# Patient Record
(demographics unavailable — no encounter records)

---

## 2024-10-31 NOTE — HISTORY OF PRESENT ILLNESS
[FreeTextEntry1] : Language: Georgian Accompanied by: Self Contact info: [phone] Referring Urologist: Janice PMD: Dr. Elton Vargas   Initial H&P 10/31/2024: Mr. TRIMBLE is a very pleasant 71-year-old gentleman who presents today for initial evaluation of post prostatectomy HEYDI and ED.   In 2018, had RALP at [facility] by Dr. Almanza.  Had HEYDI following RALP, which has been persistent since surgery, although improved since immediately after surgery.  Endorses (+) leak with cough and other abdominal straining maneuvers.  Small volume leaks.  Also endorses climacturia.  Uses 2-3ppd. Not soaked. Voids 2-3x/night.  Denies urgency or UUI.    Also endorses sensation of incomplete emptying and post-void dribbling.  PVR at Dr. GARZA's office was normal.   Was prescribed Gemtesa and Mirabegron in the past by Dr. Camacho.   Denies weak stream.    Also tried PFPT x2, which he did once at [facility] after his surgery and then again 1.5y ago.  Feels that it didn't help much, but only did it for 1 month. Did it 2x/week. Was not consistently doing it at home.   Endorses ED.  Rates firmness 1-2/4.  Rarely 2, and if it is 2, it goes away after about 10-15s.  Before surgery, had mild ED for which he was taking pills but erections were not as weak as they are now, and Viagra helped. Also was diagnosed with low T. Denies prior history of pain or curvature with erections. Denies issues with orgasm/ejaculation. Denies nocturnal erections.   Has also tried sildenafil after surgery, but did not have any effect.  Was only trying 20-40mg PRN.  Has never tried 100mg.  --------------------------------------------------------------------------------------------------------------------------------------- PMHx: Prostate Cancer, HTN, HLD, Depression, CAD, GERD, Hx of UDT  PSHx: CABG, RALP, Appy, CCY, Glaucoma, Pediatric IHR for UDT SHx: quit kristian 2016, rema kurtz   All: NKDA --------------------------------------------------------------------------------------------------------------------------------------- Physical Exam: General: NAD, sitting on exam table comfortably HEENT: NCAT, EOMI Resp: breathing comfortably on RA, b/l symm chest rise Cardiac: RRR Abd: SNTND, (+) umbilical hernia Back: (-) CVAT b/l MSK: TIFFANY razo/ FROM Psych: appropriate affect : normal appearing uncircumcised phallus, (+) CST, very small drops --------------------------------------------------------------------------------------------------------------------------------------- Results:   --------------------------------------------------------------------------------------------------------------------------------------- A/P: 71M with mild but bothersome post-prostatectomy incontinence and ED.   1. HEYDI We had a long discussion today about his post prostatectomy stress urinary incontinence and next steps in management.  I explained that it typically takes approximately 1 to 2 years for a patient to establish baseline continence following prostatectomy.  Following surgery, pelvic floor physical therapy may be performed to help hasten the recovery process, however there is significant evidence to suggest that at 1 year, there is no significant difference in continence rates between patients who have undergone pelvic floor physical therapy and those who have not.  He has specifically tried pelvic floor physical therapy twice, however  He only did it for 1 month at a time, and he notified me today that after doing extensive Kegels, he would start to experience right groin pain.  I explained that this is likely due to spasms in the pelvic floor, and I recommended that if he does want to proceed with physical therapy again, he should discuss this further with the physical therapist.  With regards to his treatment options, I explained that once physical therapy fails, the only options available are surgical in the form of sling versus AUS.  I explained that a sling has worse outcomes compared to AUS, and it is most successful in a highly selective group of patients.  He does have mild stress urinary incontinence and climacturia, both of which would be expected to improve following sling, however I also explained that it is oftentimes less successful as the BMI increases.  Although he is now 6 years postop from his prostatectomy, and I would not expect his incontinence to improve following pelvic floor physical therapy, I did explain that I have had personal success with a handful of patients who attempted physical therapy many years after the original prostatectomy.  As such, I offered him re-referral to PT, which I recommended that he do for 6 months.  He agreed with this plan, and he will see me in 3 months to assess any interval changes.  2. ED We discussed that he has multiple medical comorbidities that would contribute to his erectile dysfunction, in addition to his prior history of prostatectomy.  Given that he had erectile dysfunction prior to surgery, I explained that erectile function would be expected to worsen after prostatectomy.  He previously saw firmer erections with only 20 to 40 mg of sildenafil prior to surgery.  The maximum that he tried following surgery was 40 mg.  I am not optimistic that 100 mg would be effective, however I recommended that prior to discussing additional interventions, he should try the maximum dose.  We reviewed the various treatment modalities for erectile dysfunction and as it relates to him, we discussed the treatments he has tried as well as general goals of treatments for erectile dysfunction in general. In the end, we reviewed erectile dysfunction treatment consisting of phosphodiesterase inhibitors for those that are without contraindication, vacuum devices, intraurethral alprostadil also known as MUSE, penile injection therapy and the penile implant. He is interested in PDE5s.  AEs were discussed, including but not limited to headache, indigestion/dyspepsia, back pain/vision changes. All questions were answered.  I have answered all of his questions and will see him on follow-up in 3 months or sooner PRN.   Plan: - restart PFPT - may be interested in further discussion of sling at next visit - start sildenafil 100mg - RTC 3-6 months or sooner PRN  I spent a total of 49 minutes on face-to-face counseling, coordination of care, review of prior records and clinical documentation.

## 2025-05-09 NOTE — HISTORY OF PRESENT ILLNESS
[FreeTextEntry1] : Language: Faroese Accompanied by: Self Contact info: [phone] Referring Urologist: Janice PMD: Dr. Elton Vargas   Initial H&P 10/31/2024: Mr. TRIMBLE is a very pleasant 71-year-old gentleman who presents today for initial evaluation of post prostatectomy HEYDI and ED.   In 2018, had RALP at [facility] by Dr. Almanza.  Had HEYDI following RALP, which has been persistent since surgery, although improved since immediately after surgery.  Endorses (+) leak with cough and other abdominal straining maneuvers.  Small volume leaks.  Also endorses climacturia.  Uses 2-3ppd. Not soaked. Voids 2-3x/night.  Denies urgency or UUI.    Also endorses sensation of incomplete emptying and post-void dribbling.  PVR at Dr. GARZA's office was normal.   Was prescribed Gemtesa and Mirabegron in the past by Dr. Camacho.   Denies weak stream.    Also tried PFPT x2, which he did once at [facility] after his surgery and then again 1.5y ago.  Feels that it didn't help much, but only did it for 1 month. Did it 2x/week. Was not consistently doing it at home.   Endorses ED.  Rates firmness 1-2/4.  Rarely 2, and if it is 2, it goes away after about 10-15s.  Before surgery, had mild ED for which he was taking pills but erections were not as weak as they are now, and Viagra helped. Also was diagnosed with low T. Denies prior history of pain or curvature with erections. Denies issues with orgasm/ejaculation. Denies nocturnal erections.   Has also tried sildenafil after surgery, but did not have any effect.  Was only trying 20-40mg PRN.  Has never tried 100mg.  --------------------------------------------------------------------------------------------------------------------------------------- Interval History 05/08/2025: Presents today for f/u. Last seen in the office 10/31/24.   Recommended more aggressive PFPT last visit for his incontinence, and for his ED, he was started on sildenafil 100mg.   Today, he states that he did PT (in person) for 2 months and continued at home on his own since then.  Has not noticed a difference in incontinence. Continues to use 2-3ppd.    Recently saw Dr. Camacho, had RBUS, and at the time of the US, he had low volume in bladder and had very strong urge to go.    WRT erections - no improvement on 100mg sildenafil --------------------------------------------------------------------------------------------------------------------------------------- PMHx: Prostate Cancer, HTN, HLD, Depression, CAD, GERD, Hx of UDT  PSHx: CABG, RALP, Appy, CCY, Glaucoma, Pediatric IHR for UDT SHx: quit tog 2016, rema kurtz   All: NKDA --------------------------------------------------------------------------------------------------------------------------------------- Physical Exam: General: NAD, sitting on exam table comfortably HEENT: NCAT, EOMI Resp: breathing comfortably on RA, b/l symm chest rise Cardiac: RRR Abd: SNTND, (+) umbilical hernia Back: (-) CVAT b/l MSK: TIFFANY razo/ FROM Psych: appropriate affect : normal appearing uncircumcised phallus, (+) CST, very small drops --------------------------------------------------------------------------------------------------------------------------------------- Results:   --------------------------------------------------------------------------------------------------------------------------------------- A/P: 71M with mild but bothersome post-prostatectomy incontinence and ED.   1. HEYDI and possible UUI We had another long discussion today about his incontinence and next steps in management.  We briefly discussed surgery today (sling and AUS), and ultimately, based on his degree of incontinence, I feel that he would be a reasonable sling candidate.  He did mention today that he had strong urge to void with a low volume in his bladder, therefore I recommended he restart gemtesa and monitor for improvement of his incontinence. Given the possible mixed picture, we also discussed possible UDS.  He thinks he may have had this done with Dr. Camacho. We will obtain old records, and if adequate, will hold off on repeat.  If additional study needed --> referral to Dr. Ernandez for UDS.  If significant urgency/UUI present, may benefit from botox. I also recommended repeat cysto to r/o any potential anatomic etiologies of his symptoms, and as part of pre-op workup if he is interested in surgical intervention.   In the interim, he will also do his own research on sling.   2. ED Sildenafil was not effective in treating his ED.  He is potentially interested in additional treatment options, however we will discuss in further detail once incontinence is addressed.   3. Hypogonadism Was previously told he has low T.  Although he has a prior history of prostate ca, if PSAs have all been undetectable, and he did not have high risk disease, we discussed that TRT is not contraindicated based on the most recent evidence.  We also discussed that PDE5Is would potentially be more effective once eugonadal.   He will consider low T w/u upon f/u.   Plan: - review UDS from Janice - restart gemtesa - cysto next visit - poss low T w/u - cont sildenafil 100mg - RTC 6 weeks or sooner PRN  I spent a total of 38 minutes on face-to-face counseling, coordination of care, review of prior records and clinical documentation.

## 2025-05-09 NOTE — HISTORY OF PRESENT ILLNESS
[FreeTextEntry1] : Language: Emirati Accompanied by: Self Contact info: [phone] Referring Urologist: Janice PMD: Dr. Elton Vargas   Initial H&P 10/31/2024: Mr. TRIMBLE is a very pleasant 71-year-old gentleman who presents today for initial evaluation of post prostatectomy HEYDI and ED.   In 2018, had RALP at [facility] by Dr. Almanza.  Had HEYDI following RALP, which has been persistent since surgery, although improved since immediately after surgery.  Endorses (+) leak with cough and other abdominal straining maneuvers.  Small volume leaks.  Also endorses climacturia.  Uses 2-3ppd. Not soaked. Voids 2-3x/night.  Denies urgency or UUI.    Also endorses sensation of incomplete emptying and post-void dribbling.  PVR at Dr. GARZA's office was normal.   Was prescribed Gemtesa and Mirabegron in the past by Dr. Camacho.   Denies weak stream.    Also tried PFPT x2, which he did once at [facility] after his surgery and then again 1.5y ago.  Feels that it didn't help much, but only did it for 1 month. Did it 2x/week. Was not consistently doing it at home.   Endorses ED.  Rates firmness 1-2/4.  Rarely 2, and if it is 2, it goes away after about 10-15s.  Before surgery, had mild ED for which he was taking pills but erections were not as weak as they are now, and Viagra helped. Also was diagnosed with low T. Denies prior history of pain or curvature with erections. Denies issues with orgasm/ejaculation. Denies nocturnal erections.   Has also tried sildenafil after surgery, but did not have any effect.  Was only trying 20-40mg PRN.  Has never tried 100mg.  --------------------------------------------------------------------------------------------------------------------------------------- Interval History 05/08/2025: Presents today for f/u. Last seen in the office 10/31/24.   Recommended more aggressive PFPT last visit for his incontinence, and for his ED, he was started on sildenafil 100mg.   Today, he states that he did PT (in person) for 2 months and continued at home on his own since then.  Has not noticed a difference in incontinence. Continues to use 2-3ppd.    Recently saw Dr. Camacho, had RBUS, and at the time of the US, he had low volume in bladder and had very strong urge to go.    WRT erections - no improvement on 100mg sildenafil --------------------------------------------------------------------------------------------------------------------------------------- PMHx: Prostate Cancer, HTN, HLD, Depression, CAD, GERD, Hx of UDT  PSHx: CABG, RALP, Appy, CCY, Glaucoma, Pediatric IHR for UDT SHx: quit tog 2016, rema kurtz   All: NKDA --------------------------------------------------------------------------------------------------------------------------------------- Physical Exam: General: NAD, sitting on exam table comfortably HEENT: NCAT, EOMI Resp: breathing comfortably on RA, b/l symm chest rise Cardiac: RRR Abd: SNTND, (+) umbilical hernia Back: (-) CVAT b/l MSK: TIFFANY razo/ FROM Psych: appropriate affect : normal appearing uncircumcised phallus, (+) CST, very small drops --------------------------------------------------------------------------------------------------------------------------------------- Results:   --------------------------------------------------------------------------------------------------------------------------------------- A/P: 71M with mild but bothersome post-prostatectomy incontinence and ED.   1. HEYDI and possible UUI We had another long discussion today about his incontinence and next steps in management.  We briefly discussed surgery today (sling and AUS), and ultimately, based on his degree of incontinence, I feel that he would be a reasonable sling candidate.  He did mention today that he had strong urge to void with a low volume in his bladder, therefore I recommended he restart gemtesa and monitor for improvement of his incontinence. Given the possible mixed picture, we also discussed possible UDS.  He thinks he may have had this done with Dr. Camacho. We will obtain old records, and if adequate, will hold off on repeat.  If additional study needed --> referral to Dr. Ernandez for UDS.  If significant urgency/UUI present, may benefit from botox. I also recommended repeat cysto to r/o any potential anatomic etiologies of his symptoms, and as part of pre-op workup if he is interested in surgical intervention.   In the interim, he will also do his own research on sling.   2. ED Sildenafil was not effective in treating his ED.  He is potentially interested in additional treatment options, however we will discuss in further detail once incontinence is addressed.   3. Hypogonadism Was previously told he has low T.  Although he has a prior history of prostate ca, if PSAs have all been undetectable, and he did not have high risk disease, we discussed that TRT is not contraindicated based on the most recent evidence.  We also discussed that PDE5Is would potentially be more effective once eugonadal.   He will consider low T w/u upon f/u.   Plan: - review UDS from Janice - restart gemtesa - cysto next visit - poss low T w/u - cont sildenafil 100mg - RTC 6 weeks or sooner PRN  I spent a total of 38 minutes on face-to-face counseling, coordination of care, review of prior records and clinical documentation.

## 2025-07-02 NOTE — HISTORY OF PRESENT ILLNESS
[FreeTextEntry1] : Language: Djiboutian Accompanied by: Self Contact info: [phone] Referring Urologist: Janice PMD: Dr. Elton Vargas   Initial H&P 10/31/2024: Mr. TRIMBLE is a very pleasant 71-year-old gentleman who presents today for initial evaluation of post prostatectomy HEYDI and ED.   In 2018, had RALP at [facility] by Dr. Almanza.  Had HEYDI following RALP, which has been persistent since surgery, although improved since immediately after surgery.  Endorses (+) leak with cough and other abdominal straining maneuvers.  Small volume leaks.  Also endorses climacturia.  Uses 2-3ppd. Not soaked. Voids 2-3x/night.  Denies urgency or UUI.    Also endorses sensation of incomplete emptying and post-void dribbling.  PVR at Dr. GARZA's office was normal.   Was prescribed Gemtesa and Mirabegron in the past by Dr. Camacho.   Denies weak stream.    Also tried PFPT x2, which he did once at [facility] after his surgery and then again 1.5y ago.  Feels that it didn't help much, but only did it for 1 month. Did it 2x/week. Was not consistently doing it at home.   Endorses ED.  Rates firmness 1-2/4.  Rarely 2, and if it is 2, it goes away after about 10-15s.  Before surgery, had mild ED for which he was taking pills but erections were not as weak as they are now, and Viagra helped. Also was diagnosed with low T. Denies prior history of pain or curvature with erections. Denies issues with orgasm/ejaculation. Denies nocturnal erections.   Has also tried sildenafil after surgery, but did not have any effect.  Was only trying 20-40mg PRN.  Has never tried 100mg.  --------------------------------------------------------------------------------------------------------------------------------------- Interval History 07/01/25: Presents today for f/u. Last seen in the office 10/05/08/2025.   Recommended more aggressive PFPT previously for his incontinence, and for his ED, he was started on sildenafil 100mg.   He then went for 2 months of pelvic floor physical therapy in person, but he did not notice a difference in his degree of incontinence.  He continues to use 2-3 pads per day.  At his last visit, we discussed sling versus management of his urge urinary incontinence first.  I restarted his Gemtesa, and I reviewed his urodynamics study from Dr. Camacho, which showed severe urge at 185cc and max capacity of 300.    With regards to his erections, he did not see any improvement on 100 mg sildenafil.  T was also checked, which was 213 on 05/08/25.   Today, he states that he is seeing mixed results with gemtesa.  --------------------------------------------------------------------------------------------------------------------------------------- PMHx: Prostate Cancer, HTN, HLD, Depression, CAD, GERD, Hx of UDT  PSHx: CABG, RALP, Appy, CCY, Glaucoma, Pediatric IHR for UDT SHx: quit kristian 2016, rema kurtz   All: NKDA --------------------------------------------------------------------------------------------------------------------------------------- Physical Exam: General: NAD, sitting on exam table comfortably HEENT: NCAT, EOMI Resp: breathing comfortably on RA, b/l symm chest rise Cardiac: RRR Abd: SNTND, (+) umbilical hernia Back: (-) CVAT b/l MSK: TIFFANY razo/ FROM Psych: appropriate affect : normal appearing uncircumcised phallus, (+) CST, very small drops --------------------------------------------------------------------------------------------------------------------------------------- Results:   --------------------------------------------------------------------------------------------------------------------------------------- A/P: 71M with mild but bothersome post-prostatectomy incontinence and ED.   T recently checked and came back low.   1. HEYDI and possible UUI Previous UDS reviewed. Suggest mildly decreased capacity.  Recommended he cont gemtesa. Will consider botox based on degree of bother in the future.  Pt did his reading on sling - currently not bothered enough to proceed with surgery.   2. ED Sildenafil was not effective in treating his ED.  Asked about IPP today. Discussed in generalities. Understands that ICI would need to be done first and if ineffective/does not tolerate, would be candidate for IPP. Pt will do his own research on IPP in the meantime.   3. Hypogonadism Discussed that based on his cancer history (not high risk, all PSAs undetectable), we may start TRT.  Discussed that risk of cancer is no different from general population, however if he does already have prostate cancer present (low levels that is not producing PSA yet), then the testosterone will increase growth.  We will monitor closerly as with any TRT patient and if there is PSA detected, we will stop and reassess.   He was interested.   Discussed T Cyp vs Androgel.  Prescribed T Cyp. Pt will bring medication with him for injection teaching when able.   I have answered all of his questions today, and I will see him in ~2 weeks or sooner PRN (once he gets his T Cyp).  Plan: - cont gemtesa - start T Cyp - cont sildenafil 100mg - pt to consider ICI vs IPP - RTC ~2 weeks or sooner PRN  I spent a total of 30 minutes on face-to-face counseling, coordination of care, review of prior records and clinical documentation.

## 2025-07-02 NOTE — HISTORY OF PRESENT ILLNESS
[FreeTextEntry1] : Language: Cambodian Accompanied by: Self Contact info: [phone] Referring Urologist: Janice PMD: Dr. Elton Vargas   Initial H&P 10/31/2024: Mr. TRIMBLE is a very pleasant 71-year-old gentleman who presents today for initial evaluation of post prostatectomy HEYDI and ED.   In 2018, had RALP at [facility] by Dr. Almanza.  Had HEYDI following RALP, which has been persistent since surgery, although improved since immediately after surgery.  Endorses (+) leak with cough and other abdominal straining maneuvers.  Small volume leaks.  Also endorses climacturia.  Uses 2-3ppd. Not soaked. Voids 2-3x/night.  Denies urgency or UUI.    Also endorses sensation of incomplete emptying and post-void dribbling.  PVR at Dr. GARZA's office was normal.   Was prescribed Gemtesa and Mirabegron in the past by Dr. Camacho.   Denies weak stream.    Also tried PFPT x2, which he did once at [facility] after his surgery and then again 1.5y ago.  Feels that it didn't help much, but only did it for 1 month. Did it 2x/week. Was not consistently doing it at home.   Endorses ED.  Rates firmness 1-2/4.  Rarely 2, and if it is 2, it goes away after about 10-15s.  Before surgery, had mild ED for which he was taking pills but erections were not as weak as they are now, and Viagra helped. Also was diagnosed with low T. Denies prior history of pain or curvature with erections. Denies issues with orgasm/ejaculation. Denies nocturnal erections.   Has also tried sildenafil after surgery, but did not have any effect.  Was only trying 20-40mg PRN.  Has never tried 100mg.  --------------------------------------------------------------------------------------------------------------------------------------- Interval History 07/01/25: Presents today for f/u. Last seen in the office 10/05/08/2025.   Recommended more aggressive PFPT previously for his incontinence, and for his ED, he was started on sildenafil 100mg.   He then went for 2 months of pelvic floor physical therapy in person, but he did not notice a difference in his degree of incontinence.  He continues to use 2-3 pads per day.  At his last visit, we discussed sling versus management of his urge urinary incontinence first.  I restarted his Gemtesa, and I reviewed his urodynamics study from Dr. Camacho, which showed severe urge at 185cc and max capacity of 300.    With regards to his erections, he did not see any improvement on 100 mg sildenafil.  T was also checked, which was 213 on 05/08/25.   Today, he states that he is seeing mixed results with gemtesa.  --------------------------------------------------------------------------------------------------------------------------------------- PMHx: Prostate Cancer, HTN, HLD, Depression, CAD, GERD, Hx of UDT  PSHx: CABG, RALP, Appy, CCY, Glaucoma, Pediatric IHR for UDT SHx: quit kristian 2016, rema kurtz   All: NKDA --------------------------------------------------------------------------------------------------------------------------------------- Physical Exam: General: NAD, sitting on exam table comfortably HEENT: NCAT, EOMI Resp: breathing comfortably on RA, b/l symm chest rise Cardiac: RRR Abd: SNTND, (+) umbilical hernia Back: (-) CVAT b/l MSK: TIFFANY razo/ FROM Psych: appropriate affect : normal appearing uncircumcised phallus, (+) CST, very small drops --------------------------------------------------------------------------------------------------------------------------------------- Results:   --------------------------------------------------------------------------------------------------------------------------------------- A/P: 71M with mild but bothersome post-prostatectomy incontinence and ED.   T recently checked and came back low.   1. HEYDI and possible UUI Previous UDS reviewed. Suggest mildly decreased capacity.  Recommended he cont gemtesa. Will consider botox based on degree of bother in the future.  Pt did his reading on sling - currently not bothered enough to proceed with surgery.   2. ED Sildenafil was not effective in treating his ED.  Asked about IPP today. Discussed in generalities. Understands that ICI would need to be done first and if ineffective/does not tolerate, would be candidate for IPP. Pt will do his own research on IPP in the meantime.   3. Hypogonadism Discussed that based on his cancer history (not high risk, all PSAs undetectable), we may start TRT.  Discussed that risk of cancer is no different from general population, however if he does already have prostate cancer present (low levels that is not producing PSA yet), then the testosterone will increase growth.  We will monitor closerly as with any TRT patient and if there is PSA detected, we will stop and reassess.   He was interested.   Discussed T Cyp vs Androgel.  Prescribed T Cyp. Pt will bring medication with him for injection teaching when able.   I have answered all of his questions today, and I will see him in ~2 weeks or sooner PRN (once he gets his T Cyp).  Plan: - cont gemtesa - start T Cyp - cont sildenafil 100mg - pt to consider ICI vs IPP - RTC ~2 weeks or sooner PRN  I spent a total of 30 minutes on face-to-face counseling, coordination of care, review of prior records and clinical documentation.

## 2025-07-17 NOTE — HISTORY OF PRESENT ILLNESS
[FreeTextEntry1] : Language: Maltese Accompanied by: Self Contact info: [phone] Referring Urologist: Janice PMD: Dr. Elton Vargas   Initial H&P 10/31/2024: Mr. TRIMBLE is a very pleasant 71-year-old gentleman who presents today for initial evaluation of post prostatectomy HEYDI and ED.   In 2018, had RALP at [facility] by Dr. Almanza.  Had HEYDI following RALP, which has been persistent since surgery, although improved since immediately after surgery.  Endorses (+) leak with cough and other abdominal straining maneuvers.  Small volume leaks.  Also endorses climacturia.  Uses 2-3ppd. Not soaked. Voids 2-3x/night.  Denies urgency or UUI.    Also endorses sensation of incomplete emptying and post-void dribbling.  PVR at Dr. GARZA's office was normal.   Was prescribed Gemtesa and Mirabegron in the past by Dr. Camacho.   Denies weak stream.    Also tried PFPT x2, which he did once at [facility] after his surgery and then again 1.5y ago.  Feels that it didn't help much, but only did it for 1 month. Did it 2x/week. Was not consistently doing it at home.   Endorses ED.  Rates firmness 1-2/4.  Rarely 2, and if it is 2, it goes away after about 10-15s.  Before surgery, had mild ED for which he was taking pills but erections were not as weak as they are now, and Viagra helped. Also was diagnosed with low T. Denies prior history of pain or curvature with erections. Denies issues with orgasm/ejaculation. Denies nocturnal erections.   Has also tried sildenafil after surgery, but did not have any effect.  Was only trying 20-40mg PRN.  Has never tried 100mg.  --------------------------------------------------------------------------------------------------------------------------------------- Interval History 07/17/25: Presents today for f/u. Last seen in the office 07/01/25.   Recommended more aggressive PFPT previously for his incontinence, and for his ED, he was started on sildenafil 100mg.   He then went for 2 months of pelvic floor physical therapy in person, but he did not notice a difference in his degree of incontinence.  He continues to use 2-3 pads per day.  At his last visit, we discussed sling versus management of his urge urinary incontinence first.  I restarted his Gemtesa, and I reviewed his urodynamics study from Dr. Camacho, which showed severe urge at 185cc and max capacity of 300.    With regards to his erections, he did not see any improvement on 100 mg sildenafil.  T was also checked, which was 213 on 05/08/25.   He endorsed mixed results on the gemtesa, and after doing some of his own reading on incontinence surgery, he felt that he wasn't bothered enough to proceed. He was prescribed T Cyp at that visit, and presents today for injection teaching.   From an ED standpoint, he is considering ICI but ultimately may be interested in IPP.   No new issues in the interim.  --------------------------------------------------------------------------------------------------------------------------------------- PMHx: Prostate Cancer, HTN, HLD, Depression, CAD, GERD, Hx of UDT  PSHx: CABG, RALP, Appy, CCY, Glaucoma, Pediatric IHR for UDT SHx: quit tog 2016, denrashaad x2   All: NKDA --------------------------------------------------------------------------------------------------------------------------------------- Physical Exam: General: NAD, sitting on exam table comfortably HEENT: NCAT, EOMI Resp: breathing comfortably on RA, b/l symm chest rise Cardiac: RRR Abd: CRYSTAL, (+) umbilical hernia Back: (-) CVAT b/l MSK: TIFFANY razo/ FROM Psych: appropriate affect : normal appearing uncircumcised phallus, (+) CST, very small drops --------------------------------------------------------------------------------------------------------------------------------------- Results:   --------------------------------------------------------------------------------------------------------------------------------------- A/P: 71M with mild but bothersome post-prostatectomy incontinence and ED.   Started on T Cyp 100mg q2 weeks, with injection teaching performed today.    1. HEYDI and possible UUI Cont gemtesa. Not interested in surgery at this time. Will continue to monitor, and if LUTS worsen --> discuss surgery vs botox  2. ED No addressed today - will discuss ICI vs IPP once eugonadal  3. Hypogonadism Injection teaching performed today. Pt hesitant to do it on his own at home. Requested to come in again for 2 more injections, as he would like for me to do it until repeat BW.  Will plan to see again in for 2 more rounds, and if eugonadal after 3rd injection, and he is satisfied with results, will do it on his own at home.    I have answered all of his questions today, and I will see him in 2 weeks or sooner PRN.  Plan: - cont gemtesa - cont T Cyp - cont sildenafil 100mg - pt to consider ICI vs IPP - RTC ~2 weeks or sooner PRN  I spent a total of 20 minutes on face-to-face counseling, coordination of care, review of prior records and clinical documentation.

## 2025-07-17 NOTE — HISTORY OF PRESENT ILLNESS
[FreeTextEntry1] : Language: Ivorian Accompanied by: Self Contact info: [phone] Referring Urologist: Janice PMD: Dr. Elton Vargas   Initial H&P 10/31/2024: Mr. TRIMBLE is a very pleasant 71-year-old gentleman who presents today for initial evaluation of post prostatectomy HEYDI and ED.   In 2018, had RALP at [facility] by Dr. Almanza.  Had HEYDI following RALP, which has been persistent since surgery, although improved since immediately after surgery.  Endorses (+) leak with cough and other abdominal straining maneuvers.  Small volume leaks.  Also endorses climacturia.  Uses 2-3ppd. Not soaked. Voids 2-3x/night.  Denies urgency or UUI.    Also endorses sensation of incomplete emptying and post-void dribbling.  PVR at Dr. GARZA's office was normal.   Was prescribed Gemtesa and Mirabegron in the past by Dr. Camacho.   Denies weak stream.    Also tried PFPT x2, which he did once at [facility] after his surgery and then again 1.5y ago.  Feels that it didn't help much, but only did it for 1 month. Did it 2x/week. Was not consistently doing it at home.   Endorses ED.  Rates firmness 1-2/4.  Rarely 2, and if it is 2, it goes away after about 10-15s.  Before surgery, had mild ED for which he was taking pills but erections were not as weak as they are now, and Viagra helped. Also was diagnosed with low T. Denies prior history of pain or curvature with erections. Denies issues with orgasm/ejaculation. Denies nocturnal erections.   Has also tried sildenafil after surgery, but did not have any effect.  Was only trying 20-40mg PRN.  Has never tried 100mg.  --------------------------------------------------------------------------------------------------------------------------------------- Interval History 07/17/25: Presents today for f/u. Last seen in the office 07/01/25.   Recommended more aggressive PFPT previously for his incontinence, and for his ED, he was started on sildenafil 100mg.   He then went for 2 months of pelvic floor physical therapy in person, but he did not notice a difference in his degree of incontinence.  He continues to use 2-3 pads per day.  At his last visit, we discussed sling versus management of his urge urinary incontinence first.  I restarted his Gemtesa, and I reviewed his urodynamics study from Dr. Camacho, which showed severe urge at 185cc and max capacity of 300.    With regards to his erections, he did not see any improvement on 100 mg sildenafil.  T was also checked, which was 213 on 05/08/25.   He endorsed mixed results on the gemtesa, and after doing some of his own reading on incontinence surgery, he felt that he wasn't bothered enough to proceed. He was prescribed T Cyp at that visit, and presents today for injection teaching.   From an ED standpoint, he is considering ICI but ultimately may be interested in IPP.   No new issues in the interim.  --------------------------------------------------------------------------------------------------------------------------------------- PMHx: Prostate Cancer, HTN, HLD, Depression, CAD, GERD, Hx of UDT  PSHx: CABG, RALP, Appy, CCY, Glaucoma, Pediatric IHR for UDT SHx: quit tog 2016, denrashaad x2   All: NKDA --------------------------------------------------------------------------------------------------------------------------------------- Physical Exam: General: NAD, sitting on exam table comfortably HEENT: NCAT, EOMI Resp: breathing comfortably on RA, b/l symm chest rise Cardiac: RRR Abd: CRYSTAL, (+) umbilical hernia Back: (-) CVAT b/l MSK: TIFFANY razo/ FROM Psych: appropriate affect : normal appearing uncircumcised phallus, (+) CST, very small drops --------------------------------------------------------------------------------------------------------------------------------------- Results:   --------------------------------------------------------------------------------------------------------------------------------------- A/P: 71M with mild but bothersome post-prostatectomy incontinence and ED.   Started on T Cyp 100mg q2 weeks, with injection teaching performed today.    1. HEYDI and possible UUI Cont gemtesa. Not interested in surgery at this time. Will continue to monitor, and if LUTS worsen --> discuss surgery vs botox  2. ED No addressed today - will discuss ICI vs IPP once eugonadal  3. Hypogonadism Injection teaching performed today. Pt hesitant to do it on his own at home. Requested to come in again for 2 more injections, as he would like for me to do it until repeat BW.  Will plan to see again in for 2 more rounds, and if eugonadal after 3rd injection, and he is satisfied with results, will do it on his own at home.    I have answered all of his questions today, and I will see him in 2 weeks or sooner PRN.  Plan: - cont gemtesa - cont T Cyp - cont sildenafil 100mg - pt to consider ICI vs IPP - RTC ~2 weeks or sooner PRN  I spent a total of 20 minutes on face-to-face counseling, coordination of care, review of prior records and clinical documentation.